# Patient Record
Sex: FEMALE | Race: OTHER
[De-identification: names, ages, dates, MRNs, and addresses within clinical notes are randomized per-mention and may not be internally consistent; named-entity substitution may affect disease eponyms.]

---

## 2019-07-05 ENCOUNTER — HOSPITAL ENCOUNTER (EMERGENCY)
Dept: HOSPITAL 56 - MW.ED | Age: 34
Discharge: HOME | End: 2019-07-05
Payer: COMMERCIAL

## 2019-07-05 DIAGNOSIS — Z23: ICD-10-CM
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
## 2019-07-05 DIAGNOSIS — T23.202A: Primary | ICD-10-CM

## 2019-07-05 PROCEDURE — 90715 TDAP VACCINE 7 YRS/> IM: CPT

## 2019-07-05 PROCEDURE — 99283 EMERGENCY DEPT VISIT LOW MDM: CPT

## 2019-07-05 PROCEDURE — 90471 IMMUNIZATION ADMIN: CPT

## 2019-07-05 PROCEDURE — 16020 DRESS/DEBRID P-THICK BURN S: CPT

## 2019-07-05 PROCEDURE — 73130 X-RAY EXAM OF HAND: CPT
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
## 2019-07-08 ENCOUNTER — HOSPITAL ENCOUNTER (EMERGENCY)
Dept: HOSPITAL 56 - MW.ED | Age: 34
Discharge: HOME | End: 2019-07-08
Payer: COMMERCIAL

## 2019-07-08 DIAGNOSIS — T23.202D: Primary | ICD-10-CM
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
## 2020-03-18 NOTE — EDM.PDOC
ED HPI GENERAL MEDICAL PROBLEM





- General


Chief Complaint: ENT Problem


Stated Complaint: FEVER


Time Seen by Provider: 03/18/20 12:40


Source of Information: Reports: Patient


History Limitations: Reports: No Limitations





- History of Present Illness


INITIAL COMMENTS - FREE TEXT/NARRATIVE: 





HISTORY AND PHYSICAL:





History of present illness:


Patient is a 34-year-old female presents to the ED with complaint of sore 

throat. She states her son was recently treated for positive strep throat. She 

has had a sore throat, cough, and headache x 7 days. She denies fevers, chills, 

chest pain, shortness of breath, trismus, difficulty breathing or swallowing. 





Review of systems: 


As per history of present illness and below otherwise all systems reviewed and 

negative.





Past medical history: 


As per history of present illness and as reviewed below otherwise 

noncontributory.





Surgical history: 


As per history of present illness and as reviewed below otherwise 

noncontributory.





Social history: 


No reported history of drug or alcohol abuse.





Family history: 


As per history of present illness and as reviewed below otherwise 

noncontributory.





Physical exam:


General: Patient sitting comfortably in no acute distress and nontoxic appearing


HEENT: Tonsils are 1+ and erythematous without exudate. Uvula midline, no soft 

palate asymmetry. Atraumatic, normocephalic, pupils reactive, negative for 

conjunctival pallor or scleral icterus, mucous membranes moist, neck supple, 

nontender, trachea midline. No meningeal signs. 


Lungs: Clear to auscultation, breath sounds equal bilaterally, chest nontender.


Heart: S1S2, regular, negative for clicks, rubs, or overt murmur.


Abdomen: Soft, nondistended, nontender. Negative for masses or 

hepatosplenomegaly. Negative for costovertebral tenderness. No rigidity, rebound

, guarding.


Pelvis: Stable nontender.


Genitourinary: Deferred.


Rectal: Deferred.


Extremities: Atraumatic, negative for cords or calf pain. Neurovascular 

unremarkable.


Neuro: Awake, alert, oriented. Cranial nerves II through XII unremarkable. 

Cerebellum unremarkable. Motor and sensory unremarkable throughout. Exam 

nonfocal.





Notes: Patient has not had recent travel to endemic areas ([place], [place], [place] or 

international travel) or known sick contacts and mild respiratory symptoms and 

with limited testing availability for COVID-19, does not meet requirements for 

testing at this time. My suspicion for COVID is low and was advised to self-

quarantine if fever and worsening respiratory symptoms develop for 14 days and 

to return to ED as needed for significant symptoms.


Will treat for strep as son had recent positive. No swab done to avoid 

potential exposure to nursing staff. 





Diagnostics:


none 





Therapeutics:


none 





Prescriptions:


none





Impression: 


Acute pharyngitis 





Plan:


Take antibiotic as instructed


Alternate tylenol and motrin as needed


Infection precautions as discussed


Follow up with pediatrician


Return to ED as needed as discussed 








Definitive disposition and diagnosis as appropriate pending reevaluation and 

review of above.








  ** throat/headache


Pain Score (Numeric/FACES): 4





- Related Data


 Allergies











Allergy/AdvReac Type Severity Reaction Status Date / Time


 


No Known Allergies Allergy   Verified 03/18/20 12:22











Home Meds: 


 Home Meds





Amoxicillin 500 mg PO BID 10 Days #20 tab 03/18/20 [Rx]











Past Medical History





- Past Health History


Medical/Surgical History: Denies Medical/Surgical History


HEENT History: Reports: None


Cardiovascular History: Reports: None


Respiratory History: Reports: None


Gastrointestinal History: Reports: None


Genitourinary History: Reports: None


OB/GYN History: Reports: None


Musculoskeletal History: Reports: None


Neurological History: Reports: None


Psychiatric History: Reports: None


Endocrine/Metabolic History: Reports: None


Hematologic History: Reports: None


Immunologic History: Reports: None


Oncologic (Cancer) History: Reports: None


Dermatologic History: Reports: None





- Infectious Disease History


Infectious Disease History: Reports: Chicken Pox





- Past Surgical History


Head Surgeries/Procedures: Reports: None


HEENT Surgical History: Reports: None


Cardiovascular Surgical History: Reports: None


Respiratory Surgical History: Reports: None


GI Surgical History: Reports: None


Female  Surgical History: Reports: None


Endocrine Surgical History: Reports: None


Neurological Surgical History: Reports: None


Musculoskeletal Surgical History: Reports: None


Oncologic Surgical History: Reports: None


Dermatological Surgical History: Reports: None





Social & Family History





- Family History


Family Medical History: Noncontributory





- Tobacco Use


Smoking Status *Q: Never Smoker





- Caffeine Use


Caffeine Use: Reports: Coffee





- Recreational Drug Use


Recreational Drug Use: No





ED ROS ENT





- Review of Systems


Review Of Systems: Comprehensive ROS is negative, except as noted in HPI.





ED EXAM, ENT





- Physical Exam


Exam: See Below (see dictation)





Course





- Vital Signs


Last Recorded V/S: 


 Last Vital Signs











Temp  97.1 F   03/18/20 12:21


 


Pulse  94   03/18/20 12:21


 


Resp  18   03/18/20 12:21


 


BP      


 


Pulse Ox  99   03/18/20 12:21














Departure





- Departure


Time of Disposition: 12:40


Disposition: Home, Self-Care 01


Condition: Good


Clinical Impression: 


 Acute pharyngitis








- Discharge Information


Prescriptions: 


Amoxicillin 500 mg PO BID 10 Days #20 tab


Instructions:  Pharyngitis, Easy-to-Read


Referrals: 


Matteo Schilling MD [Primary Care Provider] - 


Forms:  ED Department Discharge


Additional Instructions: 


The following information is given to patients seen in the emergency department 

who are being discharged to home. This information is to outline your options 

for follow-up care. We provide all patients seen in our emergency department 

with a follow-up referral.





The need for follow-up, as well as the timing and circumstances, are variable 

depending upon the specifics of your emergency department visit.





If you don't have a primary care physician on staff, we will provide you with a 

referral. We always advise you to contact your personal physician following an 

emergency department visit to inform them of the circumstance of the visit and 

for follow-up with them and/or the need for any referrals to a consulting 

specialist.





The emergency department will also refer you to a specialist when appropriate. 

This referral assures that you have the opportunity for follow-up care with a 

specialist. All of these measure are taken in an effort to provide you with 

optimal care, which includes your follow-up.





Under all circumstances we always encourage you to contact your private 

physician who remains a resource for coordinating your care. When calling for 

follow-up care, please make the office aware that this follow-up is from your 

recent emergency room visit. If for any reason you are refused follow-up, 

please contact the [facility] Emergency 

Department at [phone] and asked to speak to the emergency department 

charge nurse.





[facility]


Primary Care


[address] [place] [address]


Phone: [phone]


Fax: [phone]





[facility]


[address] [place] [address]


Phone: [phone]


Fax: [phone]








Take antibiotic as instructed


Alternate tylenol and motrin as needed


Infection precautions as discussed


Follow up with pediatrician


Return to ED as needed as discussed 








Sepsis Event Note





- Evaluation


Sepsis Screening Result: No Definite Risk





- Focused Exam


Vital Signs: 


 Vital Signs











  Temp Pulse Resp Pulse Ox


 


 03/18/20 12:21  97.1 F  94  18  99











Date Exam was Performed: 03/18/20


Time Exam was Performed: 12:44

## 2021-05-27 NOTE — EDM.PDOC
ED HPI GENERAL MEDICAL PROBLEM





- General


Chief Complaint: Cardiovascular Problem


Stated Complaint: chest pain


Time Seen by Provider: 05/27/21 09:02





- History of Present Illness


INITIAL COMMENTS - FREE TEXT/NARRATIVE: 





35-year-old female presenting with palpitations for the last 2 days and 

occasional intermittent chest pain dyspnea.  She is 27 weeks pregnant.  She 

reports no complications with this pregnancy.  She is G7, P5.  1 miscarriage.


She has felt normal baby movement although she reports this morning which is not

unusual, she reports in the morning the baby does not move as much as later on 

once she is fully awake.  She has had prenatal care with Dr. Champion.


No other symptoms.


Duration: Day(s): (2)


Location: Reports: Chest


Severity: Mild


Improves with: Reports: None


Worsens with: Reports: None


Associated Symptoms: Reports: Shortness of Breath (Mild, intermittent, none now)





- Related Data


                                    Allergies











Allergy/AdvReac Type Severity Reaction Status Date / Time


 


No Known Allergies Allergy   Verified 05/27/21 08:59











Home Meds: 


                                    Home Meds





. [No Known Home Meds]  05/27/21 [History]











Past Medical History





- Past Health History


Medical/Surgical History: Denies Medical/Surgical History


HEENT History: Reports: None


Cardiovascular History: Reports: None


Respiratory History: Reports: None


Gastrointestinal History: Reports: None


Genitourinary History: Reports: None


OB/GYN History: Reports: Pregnancy


Musculoskeletal History: Reports: None


Neurological History: Reports: None


Psychiatric History: Reports: None


Endocrine/Metabolic History: Reports: None


Hematologic History: Reports: None


Immunologic History: Reports: None


Oncologic (Cancer) History: Reports: None


Dermatologic History: Reports: None





- Infectious Disease History


Infectious Disease History: Reports: Chicken Pox





- Past Surgical History


Head Surgeries/Procedures: Reports: None


HEENT Surgical History: Reports: None


Cardiovascular Surgical History: Reports: None


Respiratory Surgical History: Reports: None


GI Surgical History: Reports: None


Female  Surgical History: Reports: None


Endocrine Surgical History: Reports: None


Neurological Surgical History: Reports: None


Musculoskeletal Surgical History: Reports: None


Oncologic Surgical History: Reports: None


Dermatological Surgical History: Reports: None





Social & Family History





- Family History


Family Medical History: No Pertinent Family History





- Tobacco Use


Tobacco Use Status *Q: Never Tobacco User





- Caffeine Use


Caffeine Use: Reports: None





- Recreational Drug Use


Recreational Drug Use: No





ED ROS GENERAL





- Review of Systems


Review Of Systems: See Below


Constitutional: Reports: No Symptoms


Respiratory: Reports: Shortness of Breath (none now)


Cardiovascular: Reports: Chest Pain, Palpitations


GI/Abdominal: Reports: No Symptoms


Neurological: Reports: No Symptoms





ED EXAM, GENERAL





- Physical Exam


Exam: See Below


Exam Limited By: No Limitations


General Appearance: Alert, WD/WN, No Apparent Distress


Throat/Mouth: Normal Inspection


Head: Atraumatic, Normocephalic


Neck: Normal Inspection, Supple


Respiratory/Chest: No Respiratory Distress


Cardiovascular: Regular Rate, Rhythm, No Edema, No JVD


GI/Abdominal: Normal Bowel Sounds, Soft, Non-Tender, Other (gravid abdomen)


Back Exam: Normal Inspection, Full Range of Motion


Extremities: Normal Inspection, Normal Range of Motion


Neurological: Alert, Oriented


Psychiatric: Normal Affect, Normal Mood


Skin Exam: Warm, Dry, Intact


  ** #1 Interpretation


EKG Date: 05/27/21


Time: 08:47


Rhythm: NSR


Axis: Normal


P-Wave: Present


QRS: Normal


ST-T: Normal


QT: Normal


MA/PQ Interval: Borderline short MA





Course





- Vital Signs


Text/Narrative:: 





Patient with palpitations and mild intermittent chest pain and dyspnea.  No 

lower extremity pain or swelling.  27 weeks pregnant.  Normal pregnancy to this 

point.  Will check labs and give IV fluids as patient reports she may be 

dehydrated.  EKG sinus rhythm with no acute ischemia or arrhythmia.  Rate 82.





Labs are unremarkable.  Troponin negative.  Discussed with on-call midwife who 

will evaluate the patient and agrees with plan for fetal monitoring/NST at labor

 and delivery department here.  She requests to discharge the patient from the 

ED to labor delivery department.  We did discuss the possibility of pulmonary 

embolism, however the patient is low risk other than pregnancy status with no 

leg pain or swelling, no other PE risk factors and only mild/intermittent chest 

discomfort and dyspnea which is not currently present now and she is not 

hypoxic.


Last Recorded V/S: 


                                Last Vital Signs











Temp  97.6 F   05/27/21 08:59


 


Pulse  88   05/27/21 08:59


 


Resp  18   05/27/21 08:59


 


BP  112/56 L  05/27/21 08:59


 


Pulse Ox  97   05/27/21 08:59














- Orders/Labs/Meds


Orders: 


                               Active Orders 24 hr











 Category Date Time Status


 


 Saline Lock Insert [OM.PC] Stat Oth  05/27/21 09:03 Ordered











Labs: 


                                Laboratory Tests











  05/27/21 05/27/21 Range/Units





  09:03 09:03 


 


WBC  7.86   (4.0-11.0)  K/uL


 


RBC  3.70 L   (4.30-5.90)  M/uL


 


Hgb  10.5 L   (12.0-16.0)  g/dL


 


Hct  31.6 L   (36.0-46.0)  %


 


MCV  85.4   (80.0-98.0)  fL


 


MCH  28.4   (27.0-32.0)  pg


 


MCHC  33.2   (31.0-37.0)  g/dL


 


RDW Std Deviation  43.6   (28.0-62.0)  fl


 


RDW Coeff of Geraldine  14   (11.0-15.0)  %


 


Plt Count  246   (150-400)  K/uL


 


MPV  10.90   (7.40-12.00)  fL


 


Neut % (Auto)  73.0   (48.0-80.0)  %


 


Lymph % (Auto)  20.0   (16.0-40.0)  %


 


Mono % (Auto)  5.6   (0.0-15.0)  %


 


Eos % (Auto)  1.3   (0.0-7.0)  %


 


Baso % (Auto)  0.1   (0.0-1.5)  %


 


Neut # (Auto)  5.7   (1.4-5.7)  K/uL


 


Lymph # (Auto)  1.6   (0.6-2.4)  K/uL


 


Mono # (Auto)  0.4   (0.0-0.8)  K/uL


 


Eos # (Auto)  0.1   (0.0-0.7)  K/uL


 


Baso # (Auto)  0.0   (0.0-0.1)  K/uL


 


Nucleated RBC %  0.0   /100WBC


 


Nucleated RBCs #  0   K/uL


 


Sodium   140  (136-145)  mmol/L


 


Potassium   4.1  (3.5-5.1)  mmol/L


 


Chloride   107  ()  mmol/L


 


Carbon Dioxide   21.0  (21.0-32.0)  mmol/L


 


BUN   7  (7.0-18.0)  mg/dL


 


Creatinine   0.5 L  (0.6-1.0)  mg/dL


 


Est Cr Clr Drug Dosing   114.95  mL/min


 


Estimated GFR (MDRD)   > 60.0  ml/min


 


Glucose   82  ()  mg/dL


 


Calcium   9.0  (8.5-10.1)  mg/dL


 


Total Bilirubin   0.2  (0.2-1.0)  mg/dL


 


AST   16  (15-37)  IU/L


 


ALT   14  (14-63)  IU/L


 


Alkaline Phosphatase   91  ()  U/L


 


Troponin I   < 0.050  (0.000-0.056)  ng/mL


 


Total Protein   6.8  (6.4-8.2)  g/dL


 


Albumin   2.7 L  (3.4-5.0)  g/dL


 


Globulin   4.1 H  (2.6-4.0)  g/dL


 


Albumin/Globulin Ratio   0.7 L  (0.9-1.6)  











Meds: 


Medications














Discontinued Medications














Generic Name Dose Route Start Last Admin





  Trade Name Freq  PRN Reason Stop Dose Admin


 


Sodium Chloride  1,000 mls @ 999 mls/hr  05/27/21 09:03  05/27/21 09:28





  Normal Saline  IV  05/27/21 10:03  999 mls/hr





  .Bolus ONE   Administration


 


Sodium Chloride  10 ml  05/27/21 09:03  05/27/21 09:28





  Sodium Chloride 0.9% 10 Ml Syringe  FLUSH   10 ml





  ASDIRECTED PRN   Administration





  Keep Vein Open  


 


Sodium Chloride  2.5 ml  05/27/21 09:03  05/27/21 09:28





  Sodium Chloride 0.9% 2.5 Ml Syringe  FLUSH   2.5 ml





  ASDIRECTED PRN   Administration





  Keep Vein Open  














Departure





- Departure


Time of Disposition: 10:03


Disposition: Still A Patient 30


Clinical Impression: 


 Palpitations, Third trimester pregnancy, Palpitations with regular cardiac 

rhythm





Instructions:  Shortness of Breath, Adult, Easy-to-Read, Third Trimester of 

Pregnancy, Easy-to-Read, Palpitations, Easy-to-Read


Referrals: 


Matteo Schilling MD [Primary Care Provider] - 1 Day


Forms:  ED Department Discharge


Additional Instructions: 


The following information is given to patients seen in the emergency department 

who are being discharged to home. This information is to outline your options 

for follow-up care. We provide all patients seen in our emergency department 

with a follow-up referral.


The need for follow-up, as well as the timing and circumstances, are variable 

depending upon the specifics of your emergency department visit.


If you don't have a primary care physician on staff, we will provide you with a 

referral. We always advise you to contact your personal physician following an 

emergency department visit to inform them of the circumstance of the visit and 

for follow-up with them and/or the need for any referrals to a consulting 

specialist.


The emergency department will also refer you to a specialist when appropriate. 

This referral assures that you have the opportunity for follow-up care with a 

specialist. All of these measure are taken in an effort to provide you with 

optimal care, which includes your follow-up.


Under all circumstances we always encourage you to contact your private 

physician who remains a resource for coordinating your care. When calling for 

follow-up care, please make the office aware that this follow-up is from your 

recent emergency room visit. If for any reason you are refused follow-up, please

contact the [facility] Emergency Department

at [phone] and asked to speak to the emergency department charge nurse.





[facility]


Primary Care


[address] [place] [id number]


Phone: [phone]


Fax: [phone]





[place] [address]


Phone: [phone]


Fax: [phone]





You are to be transferred to the OB unit for further fetal monitoring. Your lab 

results are all within normal limits today. If your symptoms worsen or change 

after discharge, please return to the ED. Please continue to follow up with your

OBGYN as well as your PCP. 





Sepsis Event Note (ED)





- Evaluation


Sepsis Screening Result: No Definite Risk





- Focused Exam


Vital Signs: 


                                   Vital Signs











  Temp Pulse Resp BP Pulse Ox


 


 05/27/21 08:59  97.6 F  88  18  112/56 L  97














ED Communication





- Discussed Case With (1)


Discussed Case With (1): Other Provider


Person/s Notified (1): Mag Jason (Case discussed with Mag midwife for 

Dr. Champion, discussed my concerns, she agrees with plan and accepts the patient 

to come see in L&D for fetal monitoring, they will follow up labs.)


Date: 05/27/21


Time Called: 09:55





- My Orders


Last 24 Hours: 


My Active Orders





05/27/21 09:03


Saline Lock Insert [OM.PC] Stat 














- Assessment/Plan


Last 24 Hours: 


My Active Orders





05/27/21 09:03


Saline Lock Insert [OM.PC] Stat